# Patient Record
Sex: MALE
[De-identification: names, ages, dates, MRNs, and addresses within clinical notes are randomized per-mention and may not be internally consistent; named-entity substitution may affect disease eponyms.]

---

## 2020-08-28 ENCOUNTER — HOSPITAL ENCOUNTER (INPATIENT)
Dept: HOSPITAL 72 - EMR | Age: 70
LOS: 4 days | Discharge: HOME | DRG: 177 | End: 2020-09-01
Payer: MEDICARE

## 2020-08-28 VITALS — DIASTOLIC BLOOD PRESSURE: 89 MMHG | SYSTOLIC BLOOD PRESSURE: 157 MMHG

## 2020-08-28 VITALS — DIASTOLIC BLOOD PRESSURE: 67 MMHG | SYSTOLIC BLOOD PRESSURE: 128 MMHG

## 2020-08-28 VITALS — BODY MASS INDEX: 30.56 KG/M2 | WEIGHT: 179 LBS | HEIGHT: 64 IN

## 2020-08-28 VITALS — SYSTOLIC BLOOD PRESSURE: 117 MMHG | DIASTOLIC BLOOD PRESSURE: 69 MMHG

## 2020-08-28 VITALS — SYSTOLIC BLOOD PRESSURE: 170 MMHG | DIASTOLIC BLOOD PRESSURE: 90 MMHG

## 2020-08-28 DIAGNOSIS — R19.7: ICD-10-CM

## 2020-08-28 DIAGNOSIS — Z85.038: ICD-10-CM

## 2020-08-28 DIAGNOSIS — U07.1: Primary | ICD-10-CM

## 2020-08-28 DIAGNOSIS — R11.2: ICD-10-CM

## 2020-08-28 DIAGNOSIS — J12.89: ICD-10-CM

## 2020-08-28 DIAGNOSIS — D72.810: ICD-10-CM

## 2020-08-28 LAB
ADD MANUAL DIFF: NO
ALBUMIN SERPL-MCNC: 3.3 G/DL (ref 3.4–5)
ALBUMIN/GLOB SERPL: 0.9 {RATIO} (ref 1–2.7)
ALP SERPL-CCNC: 88 U/L (ref 46–116)
ALT SERPL-CCNC: 24 U/L (ref 12–78)
ANION GAP SERPL CALC-SCNC: 7 MMOL/L (ref 5–15)
APPEARANCE UR: CLEAR
APTT BLD: 31 SEC (ref 23–33)
APTT PPP: (no result) S
AST SERPL-CCNC: 21 U/L (ref 15–37)
BASOPHILS NFR BLD AUTO: 0.3 % (ref 0–2)
BILIRUB SERPL-MCNC: 0.3 MG/DL (ref 0.2–1)
BUN SERPL-MCNC: 13 MG/DL (ref 7–18)
CALCIUM SERPL-MCNC: 8.2 MG/DL (ref 8.5–10.1)
CHLORIDE SERPL-SCNC: 103 MMOL/L (ref 98–107)
CO2 SERPL-SCNC: 30 MMOL/L (ref 21–32)
CREAT SERPL-MCNC: 1 MG/DL (ref 0.55–1.3)
EOSINOPHIL NFR BLD AUTO: 2.6 % (ref 0–3)
ERYTHROCYTE [DISTWIDTH] IN BLOOD BY AUTOMATED COUNT: 11.3 % (ref 11.6–14.8)
FERRITIN SERPL-MCNC: 135 NG/ML (ref 8–388)
GLOBULIN SER-MCNC: 3.8 G/DL
GLUCOSE UR STRIP-MCNC: NEGATIVE MG/DL
HCT VFR BLD CALC: 41.6 % (ref 42–52)
HGB BLD-MCNC: 13.5 G/DL (ref 14.2–18)
INR PPP: 1 (ref 0.9–1.1)
KETONES UR QL STRIP: NEGATIVE
LEUKOCYTE ESTERASE UR QL STRIP: NEGATIVE
LYMPHOCYTES NFR BLD AUTO: 14.1 % (ref 20–45)
MCV RBC AUTO: 90 FL (ref 80–99)
MONOCYTES NFR BLD AUTO: 7 % (ref 1–10)
NEUTROPHILS NFR BLD AUTO: 76 % (ref 45–75)
NITRITE UR QL STRIP: NEGATIVE
PH UR STRIP: 7 [PH] (ref 4.5–8)
PLATELET # BLD: 221 K/UL (ref 150–450)
POTASSIUM SERPL-SCNC: 4.1 MMOL/L (ref 3.5–5.1)
PROT UR QL STRIP: NEGATIVE
RBC # BLD AUTO: 4.62 M/UL (ref 4.7–6.1)
SODIUM SERPL-SCNC: 140 MMOL/L (ref 136–145)
SP GR UR STRIP: 1.01 (ref 1–1.03)
UROBILINOGEN UR-MCNC: NORMAL MG/DL (ref 0–1)
WBC # BLD AUTO: 4.5 K/UL (ref 4.8–10.8)

## 2020-08-28 PROCEDURE — 96375 TX/PRO/DX INJ NEW DRUG ADDON: CPT

## 2020-08-28 PROCEDURE — 96361 HYDRATE IV INFUSION ADD-ON: CPT

## 2020-08-28 PROCEDURE — 83735 ASSAY OF MAGNESIUM: CPT

## 2020-08-28 PROCEDURE — 82728 ASSAY OF FERRITIN: CPT

## 2020-08-28 PROCEDURE — 81003 URINALYSIS AUTO W/O SCOPE: CPT

## 2020-08-28 PROCEDURE — 96374 THER/PROPH/DIAG INJ IV PUSH: CPT

## 2020-08-28 PROCEDURE — 71045 X-RAY EXAM CHEST 1 VIEW: CPT

## 2020-08-28 PROCEDURE — 36415 COLL VENOUS BLD VENIPUNCTURE: CPT

## 2020-08-28 PROCEDURE — 85730 THROMBOPLASTIN TIME PARTIAL: CPT

## 2020-08-28 PROCEDURE — 86140 C-REACTIVE PROTEIN: CPT

## 2020-08-28 PROCEDURE — 85610 PROTHROMBIN TIME: CPT

## 2020-08-28 PROCEDURE — 85651 RBC SED RATE NONAUTOMATED: CPT

## 2020-08-28 PROCEDURE — 85379 FIBRIN DEGRADATION QUANT: CPT

## 2020-08-28 PROCEDURE — 99285 EMERGENCY DEPT VISIT HI MDM: CPT

## 2020-08-28 PROCEDURE — 85025 COMPLETE CBC W/AUTO DIFF WBC: CPT

## 2020-08-28 PROCEDURE — 80053 COMPREHEN METABOLIC PANEL: CPT

## 2020-08-28 PROCEDURE — 83036 HEMOGLOBIN GLYCOSYLATED A1C: CPT

## 2020-08-28 PROCEDURE — 83690 ASSAY OF LIPASE: CPT

## 2020-08-28 RX ADMIN — ENOXAPARIN SODIUM SCH MG: 40 INJECTION SUBCUTANEOUS at 14:00

## 2020-08-28 RX ADMIN — DEXTROSE, SODIUM CHLORIDE, AND POTASSIUM CHLORIDE SCH MLS/HR: 5; .45; .15 INJECTION INTRAVENOUS at 14:00

## 2020-08-28 RX ADMIN — MORPHINE SULFATE PRN MG: 4 INJECTION INTRAVENOUS at 17:50

## 2020-08-28 RX ADMIN — MAGNESIUM HYDROXIDE PRN ML: 400 SUSPENSION ORAL at 21:29

## 2020-08-28 RX ADMIN — MORPHINE SULFATE PRN MG: 4 INJECTION INTRAVENOUS at 21:26

## 2020-08-28 RX ADMIN — AZITHROMYCIN DIHYDRATE SCH MG: 250 TABLET, FILM COATED ORAL at 14:00

## 2020-08-28 NOTE — EMERGENCY ROOM REPORT
History of Present Illness


General


Chief Complaint:  Abdominal Pain


Source:  Patient





Present Illness


HPI


70-year-old male presents for evaluation.  Brought in by EMS from home for 

vomiting and diarrhea.  States symptoms started yesterday.  Pain is cramping, 6 

out of 10, nonradiating.  Denies fevers or chills.  Denies chest pain or 

shortness of breath.  States that earlier this week he tested positive for 

COVID.  Had a mild cough so he got tested.  Lives with several of her family 

members.  Unclear of their COVID status.  No other aggravating relieving 

factors.  Denies any other associated symptoms


Allergies:  


Coded Allergies:  


     No Known Allergies (Unverified , 4/5/13)





COVID-19 Screening


Contact w/high risk pt:  No


Experienced COVID-19 symptoms?:  Yes


COVID-19 Testing performed PTA:  Yes


COVID-19 Screening:  Positive COVID-19


COVID-19 Testing Source:  NOSE





Patient History


Past Medical History:  other - colon ca


Past Surgical History:  none


Pertinent Family History:  none


Social History:  Denies: smoking, alcohol use, drug use


Immunizations:  UTD


Reviewed Nursing Documentation:  PMH: Agreed; PSxH: Agreed





Nursing Documentation-PMH


Past Medical History:  No Stated History


Hx Cancer:  Yes - COLON CA


Hx Gastrointestinal Problems:  Yes - COLOSTOMY





Review of Systems


All Other Systems:  negative except mentioned in HPI





Physical Exam





Vital Signs








  Date Time  Temp Pulse Resp B/P (MAP) Pulse Ox O2 Delivery O2 Flow Rate FiO2


 


8/28/20 09:18 98.8 88 16 170/90 (116) 96 Room Air  








Sp02 EP Interpretation:  reviewed, normal


General Appearance:  no apparent distress, alert, GCS 15, non-toxic


Head:  normocephalic, atraumatic


Eyes:  bilateral eye normal inspection, bilateral eye PERRL


ENT:  hearing grossly normal, normal pharynx, no angioedema, normal voice


Neck:  full range of motion, supple/symm/no masses


Respiratory:  chest non-tender, lungs clear, normal breath sounds, speaking 

full sentences


Cardiovascular #1:  regular rate, rhythm, no edema


Cardiovascular #2:  2+ carotid (R), 2+ carotid (L), 2+ radial (R), 2+ radial (L)

, 2+ dorsalis pedis (R), 2+ dorsalis pedis (L)


Gastrointestinal:  normal bowel sounds, non tender, soft, non-distended, no 

guarding, no rebound


Rectal:  deferred


Genitourinary:  normal inspection, no CVA tenderness


Musculoskeletal:  back normal, normal range of motion, gait/station normal, non-

tender


Neurologic:  alert, motor strength/tone normal, oriented x3, sensory intact, 

responsive, speech normal


Psychiatric:  judgement/insight normal, memory normal, mood/affect normal, no 

suicidal/homicidal ideation


Reflexes:  3+ bicep (R), 3+ bicep (L), 3+ tricep (R), 3+ tricep (L), 3+ knee (R)

, 3+ knee (L)


Lymphatic:  no adenopathy





Medical Decision Making


Diagnostic Impression:  


 Primary Impression:  


 Nausea vomiting and diarrhea


 Additional Impressions:  


 Weakness


 COVID-19


ER Course


Hospital Course 


70-year-old male presents ED with weakness, vomiting and diarrhea





Differentialcolitis, dehydration, COVID





Clinical course


After initial history and physical I ordered labs, IV fluids, meds, chest x-ray





Labs - leukopenia noted, Hb/Hct stable.  electrolytes ok.


COVID + 


CXR - no acute process





IV hydration continued.


Case discussed with Dr. Burch and he agreed to accept the patient to his 

service for further care and support 





I feel this is a highly complex case requiring extensive working including EKG/

Rhythm strip, Xray/CT/US, Blood/urine lab work, repeat exams while in ED, and 

administration of strong opiates/narcotics for pain control, admission to 

hospital or close patient follow up.  





Diagnosis -nausea vomiting and diarrhea, weakness, COVID-19





Patient admitted to hospital in serious condition





Laboratory Tests








Test


  8/28/20


09:32


 


White Blood Count


  4.5 K/UL


(4.8-10.8)  L


 


Red Blood Count


  4.62 M/UL


(4.70-6.10)  L


 


Hemoglobin


  13.5 G/DL


(14.2-18.0)  L


 


Hematocrit


  41.6 %


(42.0-52.0)  L


 


Mean Corpuscular Volume 90 FL (80-99)  


 


Mean Corpuscular Hemoglobin


  29.3 PG


(27.0-31.0)


 


Mean Corpuscular Hemoglobin


Concent 32.5 G/DL


(32.0-36.0)


 


Red Cell Distribution Width


  11.3 %


(11.6-14.8)  L


 


Platelet Count


  221 K/UL


(150-450)


 


Mean Platelet Volume


  5.6 FL


(6.5-10.1)  L


 


Neutrophils (%) (Auto)


  76.0 %


(45.0-75.0)  H


 


Lymphocytes (%) (Auto)


  14.1 %


(20.0-45.0)  L


 


Monocytes (%) (Auto)


  7.0 %


(1.0-10.0)


 


Eosinophils (%) (Auto)


  2.6 %


(0.0-3.0)


 


Basophils (%) (Auto)


  0.3 %


(0.0-2.0)


 


Erythrocyte Sedimentation Rate


  24 MM/HR


(0-20)  H


 


Prothrombin Time


  10.9 SEC


(9.30-11.50)


 


Prothromb Time International


Ratio 1.0 (0.9-1.1)  


 


 


Activated Partial


Thromboplast Time 31 SEC (23-33)


 


 


D-Dimer


  0.26 mg/L FEU


(0.00-0.49)


 


Urine Color Pale yellow  


 


Urine Appearance Clear  


 


Urine pH 7 (4.5-8.0)  


 


Urine Specific Gravity


  1.010


(1.005-1.035)


 


Urine Protein


  Negative


(NEGATIVE)


 


Urine Glucose (UA)


  Negative


(NEGATIVE)


 


Urine Ketones


  Negative


(NEGATIVE)


 


Urine Blood


  Negative


(NEGATIVE)


 


Urine Nitrite


  Negative


(NEGATIVE)


 


Urine Bilirubin


  Negative


(NEGATIVE)


 


Urine Urobilinogen


  Normal MG/DL


(0.0-1.0)


 


Urine Leukocyte Esterase


  Negative


(NEGATIVE)


 


Sodium Level


  140 MMOL/L


(136-145)


 


Potassium Level


  4.1 MMOL/L


(3.5-5.1)


 


Chloride Level


  103 MMOL/L


()


 


Carbon Dioxide Level


  30 MMOL/L


(21-32)


 


Anion Gap


  7 mmol/L


(5-15)


 


Blood Urea Nitrogen


  13 mg/dL


(7-18)


 


Creatinine


  1.0 MG/DL


(0.55-1.30)


 


Estimat Glomerular Filtration


Rate > 60 mL/min


(>60)


 


Glucose Level


  102 MG/DL


()


 


Calcium Level


  8.2 MG/DL


(8.5-10.1)  L


 


Ferritin


  135 NG/ML


(8-388)


 


Total Bilirubin


  0.3 MG/DL


(0.2-1.0)


 


Aspartate Amino Transf


(AST/SGOT) 21 U/L (15-37)


 


 


Alanine Aminotransferase


(ALT/SGPT) 24 U/L (12-78)


 


 


Alkaline Phosphatase


  88 U/L


()


 


C-Reactive Protein,


Quantitative < 0.4 mg/dL


(0.00-0.90)


 


Total Protein


  7.1 G/DL


(6.4-8.2)


 


Albumin


  3.3 G/DL


(3.4-5.0)  L


 


Globulin 3.8 g/dL  


 


Albumin/Globulin Ratio


  0.9 (1.0-2.7)


L


 


Lipase


  102 U/L


()








Chest X-Ray Diagnostic Results


Chest X-Ray Diagnostic Results :  


   Chest X-Ray Ordered:  Yes


   # of Views/Limited/Complete:  1 View


   Indication:  Other


   EP Interpretation:  Yes


   Interpretation:  no consolidation, no effusion, no pneumothorax


   Impression:  No acute disease


   Electronically Signed by:  Electronically signed by Jerrod Oquendo MD





Last Vital Signs








  Date Time  Temp Pulse Resp B/P (MAP) Pulse Ox O2 Delivery O2 Flow Rate FiO2


 


8/28/20 09:29  88 16   Room Air  


 


8/28/20 09:29 98.8   170/90 96   








Status:  improved


Disposition:  ADMITTED AS INPATIENT


Condition:  Serious


Referrals:  


[facility],REFERRING (PCP)











Jerrod Oquendo MD Aug 28, 2020 10:10

## 2020-08-28 NOTE — HISTORY & PHYSICAL
History and Physical


History & Physicial


 dictated [id number]











Andrew Burch MD Aug 28, 2020 13:14

## 2020-08-28 NOTE — HISTORY AND PHYSICAL REPORT
DATE OF ADMISSION:  08/28/2020

CHIEF COMPLAINT:  Diarrhea, abdominal pain, and chest pain.



HISTORY OF PRESENT ILLNESS:  This is a 70-year-old  male, who

started getting weak and dizzy on last Monday, which is four days prior to

admission.  The following day, he started having diarrhea.  On Monday, he

was seen at [facility] and was tested positive for COVID-19. His symptoms of

diarrhea continued and finally he came to the emergency room.  The patient

has also chest pain and some cough and not short of breath.



PAST MEDICAL HISTORY:  Reported history of colon cancer.



MEDICATIONS:  Reviewed in the EMR.



SOCIAL HISTORY:  No history of smoking or alcohol abuse.  The patient lives

at home with family.



ALLERGIES:  No known drug allergies.



REVIEW OF SYSTEMS:  As above.



PHYSICAL EXAMINATION:

GENERAL:  The patient is 70-year-old male, in no acute distress.

VITAL SIGNS:  Blood pressure 170/90, pulse 88, respirations 16, temperature

98.8.  The patient's O2 saturation is 96% on room air.

HEENT:  Pink conjunctivae.  Anicteric sclerae.

NECK:  Supple.

LUNGS:  Clear to auscultation.

HEART:  S1, S2 without murmurs or rubs.

ABDOMEN:  Soft, nontender.

EXTREMITIES:  No cyanosis or edema.



LABORATORY FINDINGS:  The CBC shows a WBC of 4500, hematocrit is 41.6,

hemoglobin is 13.5, and platelet is 221,000.  Chemistry panel shows a

serum sodium 140, potassium 4.1, chloride 103, carbon dioxide 30, BUN 13,

creatinine 1, calcium is 8.2.  Albumin is 3.3.  UA was negative.



ASSESSMENT:  This is 70-year-old  male, who comes to the emergency

room with diarrhea, chest pain, abdominal pain, some cough and reported

COVID-19 test positivity.  So far, he does not have shortness of breath.

Denies respiratory symptoms.



PLAN:  The patient will be admitted.  He is going to be on empiric

Zithromax.  ID consultation will be obtained.  He will be hydrated with IV

fluids.  The patient will be on full liquid diet.  DVT prophylaxis with

Lovenox.  Once he is improved, he can go home and needs to be in

isolation.









  ______________________________________________

  Andrew Burch M.D.





DR:  GILA

D:  08/28/2020 13:15

T:  08/28/2020 23:21

JOB#:  [id number]

CC:

## 2020-08-29 VITALS — DIASTOLIC BLOOD PRESSURE: 68 MMHG | SYSTOLIC BLOOD PRESSURE: 121 MMHG

## 2020-08-29 VITALS — DIASTOLIC BLOOD PRESSURE: 70 MMHG | SYSTOLIC BLOOD PRESSURE: 149 MMHG

## 2020-08-29 VITALS — DIASTOLIC BLOOD PRESSURE: 66 MMHG | SYSTOLIC BLOOD PRESSURE: 127 MMHG

## 2020-08-29 VITALS — DIASTOLIC BLOOD PRESSURE: 64 MMHG | SYSTOLIC BLOOD PRESSURE: 134 MMHG

## 2020-08-29 VITALS — DIASTOLIC BLOOD PRESSURE: 82 MMHG | SYSTOLIC BLOOD PRESSURE: 133 MMHG

## 2020-08-29 VITALS — DIASTOLIC BLOOD PRESSURE: 66 MMHG | SYSTOLIC BLOOD PRESSURE: 148 MMHG

## 2020-08-29 VITALS — SYSTOLIC BLOOD PRESSURE: 149 MMHG | DIASTOLIC BLOOD PRESSURE: 73 MMHG

## 2020-08-29 VITALS — DIASTOLIC BLOOD PRESSURE: 82 MMHG | SYSTOLIC BLOOD PRESSURE: 154 MMHG

## 2020-08-29 VITALS — DIASTOLIC BLOOD PRESSURE: 72 MMHG | SYSTOLIC BLOOD PRESSURE: 141 MMHG

## 2020-08-29 VITALS — DIASTOLIC BLOOD PRESSURE: 66 MMHG | SYSTOLIC BLOOD PRESSURE: 137 MMHG

## 2020-08-29 VITALS — DIASTOLIC BLOOD PRESSURE: 67 MMHG | SYSTOLIC BLOOD PRESSURE: 126 MMHG

## 2020-08-29 VITALS — DIASTOLIC BLOOD PRESSURE: 80 MMHG | SYSTOLIC BLOOD PRESSURE: 156 MMHG

## 2020-08-29 VITALS — DIASTOLIC BLOOD PRESSURE: 63 MMHG | SYSTOLIC BLOOD PRESSURE: 130 MMHG

## 2020-08-29 VITALS — DIASTOLIC BLOOD PRESSURE: 75 MMHG | SYSTOLIC BLOOD PRESSURE: 151 MMHG

## 2020-08-29 LAB
ADD MANUAL DIFF: NO
ALBUMIN SERPL-MCNC: 3.4 G/DL (ref 3.4–5)
ALBUMIN/GLOB SERPL: 0.8 {RATIO} (ref 1–2.7)
ALP SERPL-CCNC: 90 U/L (ref 46–116)
ALT SERPL-CCNC: 26 U/L (ref 12–78)
ANION GAP SERPL CALC-SCNC: 5 MMOL/L (ref 5–15)
AST SERPL-CCNC: 23 U/L (ref 15–37)
BASOPHILS NFR BLD AUTO: 0.4 % (ref 0–2)
BILIRUB SERPL-MCNC: 0.3 MG/DL (ref 0.2–1)
BUN SERPL-MCNC: 10 MG/DL (ref 7–18)
CALCIUM SERPL-MCNC: 8.1 MG/DL (ref 8.5–10.1)
CHLORIDE SERPL-SCNC: 101 MMOL/L (ref 98–107)
CO2 SERPL-SCNC: 32 MMOL/L (ref 21–32)
CREAT SERPL-MCNC: 1 MG/DL (ref 0.55–1.3)
EOSINOPHIL NFR BLD AUTO: 1.4 % (ref 0–3)
ERYTHROCYTE [DISTWIDTH] IN BLOOD BY AUTOMATED COUNT: 11.6 % (ref 11.6–14.8)
GLOBULIN SER-MCNC: 4.1 G/DL
HCT VFR BLD CALC: 41.2 % (ref 42–52)
HGB BLD-MCNC: 13.3 G/DL (ref 14.2–18)
LYMPHOCYTES NFR BLD AUTO: 9.9 % (ref 20–45)
MCV RBC AUTO: 91 FL (ref 80–99)
MONOCYTES NFR BLD AUTO: 4.5 % (ref 1–10)
NEUTROPHILS NFR BLD AUTO: 83.8 % (ref 45–75)
PLATELET # BLD: 216 K/UL (ref 150–450)
POTASSIUM SERPL-SCNC: 4.3 MMOL/L (ref 3.5–5.1)
RBC # BLD AUTO: 4.54 M/UL (ref 4.7–6.1)
SODIUM SERPL-SCNC: 138 MMOL/L (ref 136–145)
WBC # BLD AUTO: 5 K/UL (ref 4.8–10.8)

## 2020-08-29 RX ADMIN — DEXTROSE, SODIUM CHLORIDE, AND POTASSIUM CHLORIDE SCH MLS/HR: 5; .45; .15 INJECTION INTRAVENOUS at 00:05

## 2020-08-29 RX ADMIN — MORPHINE SULFATE PRN MG: 4 INJECTION INTRAVENOUS at 05:28

## 2020-08-29 RX ADMIN — DEXTROSE, SODIUM CHLORIDE, AND POTASSIUM CHLORIDE SCH MLS/HR: 5; .45; .15 INJECTION INTRAVENOUS at 09:03

## 2020-08-29 RX ADMIN — AZITHROMYCIN DIHYDRATE SCH MG: 250 TABLET, FILM COATED ORAL at 08:56

## 2020-08-29 RX ADMIN — MORPHINE SULFATE PRN MG: 4 INJECTION INTRAVENOUS at 10:25

## 2020-08-29 RX ADMIN — ENOXAPARIN SODIUM SCH MG: 40 INJECTION SUBCUTANEOUS at 08:56

## 2020-08-29 RX ADMIN — MORPHINE SULFATE PRN MG: 4 INJECTION INTRAVENOUS at 20:56

## 2020-08-29 RX ADMIN — MAGNESIUM HYDROXIDE PRN ML: 400 SUSPENSION ORAL at 21:02

## 2020-08-29 RX ADMIN — DEXTROSE, SODIUM CHLORIDE, AND POTASSIUM CHLORIDE SCH MLS/HR: 5; .45; .15 INJECTION INTRAVENOUS at 21:30

## 2020-08-29 NOTE — GENERAL PROGRESS NOTE
Assessment/Plan


Problem List:  


(1) COVID-19


ICD Codes:  U07.1 - COVID-19


SNOMED:  239084038


(2) Nausea vomiting and diarrhea


ICD Codes:  R11.2 - Nausea with vomiting, unspecified; R19.7 - Diarrhea, 

unspecified


SNOMED:  4984206


(3) Weakness


ICD Codes:  R53.1 - Weakness; R19.7 - Diarrhea, unspecified


SNOMED:  16194279


Assessment/Plan:


abxs


IVF


follow labs


Discussed with RN





Subjective


Allergies:  


Coded Allergies:  


     No Known Allergies (Unverified , 4/5/13)


Subjective


diarrhea is better





Objective





Last 24 Hour Vital Signs








  Date Time  Temp Pulse Resp B/P (MAP) Pulse Ox O2 Delivery O2 Flow Rate FiO2


 


8/29/20 12:30 97.7 70 18 154/82 (106) 97   


 


8/29/20 08:30 97.6 81 18 148/66 (93) 98   


 


8/29/20 08:00 97.5 81 18 151/75 (100) 99   


 


8/29/20 07:48      Room Air  


 


8/29/20 07:30 97.6 72 18 134/64 (87) 97   


 


8/29/20 06:30 98.0 68 18 126/67 (86) 98   


 


8/29/20 05:30 98.8 74 18 149/70 (96) 96   


 


8/29/20 04:30 97.5 71 18 149/73 (98) 99   


 


8/29/20 04:00 97.5 63 18 127/66 (86) 97   


 


8/29/20 03:30 97.4 59 18 130/63 (85) 100   


 


8/29/20 03:24  56 20  96   


 


8/29/20 03:19 97.3    96 Nasal Cannula 2.0 


 


8/29/20 03:00 97.6 63 16 141/72 (95) 100   


 


8/29/20 00:00 98.3 79 18 121/68 (85) 97   


 


8/28/20 21:00      Room Air  


 


8/28/20 20:00 98.0 74 18 117/69 (85) 97   


 


8/28/20 16:00 98.9 66 18 128/67 (87) 97   

















Intake and Output  


 


 8/28/20 8/29/20





 19:00 07:00


 


Intake Total 1100 ml 1000 ml


 


Output Total  1400 ml


 


Balance 1100 ml -400 ml


 


  


 


Intake Oral 0 ml 


 


IV Total 500 ml 1000 ml


 


Other 600 ml 


 


Output Urine Total  1400 ml


 


# Voids  5








Laboratory Tests


8/29/20 05:30: 


White Blood Count 5.0, Red Blood Count 4.54L, Hemoglobin 13.3L, Hematocrit 41.2L

, Mean Corpuscular Volume 91, Mean Corpuscular Hemoglobin 29.2, Mean 

Corpuscular Hemoglobin Concent 32.2, Red Cell Distribution Width 11.6, Platelet 

Count 216, Mean Platelet Volume 5.3L, Neutrophils (%) (Auto) 83.8H, Lymphocytes 

(%) (Auto) 9.9L, Monocytes (%) (Auto) 4.5, Eosinophils (%) (Auto) 1.4, 

Basophils (%) (Auto) 0.4, Sodium Level 138, Potassium Level 4.3, Chloride Level 

101, Carbon Dioxide Level 32, Anion Gap 5, Blood Urea Nitrogen 10, Creatinine 

1.0, Estimat Glomerular Filtration Rate > 60, Glucose Level 129H, Hemoglobin 

A1c 5.9, Calcium Level 8.1L, Magnesium Level 2.2, Total Bilirubin 0.3, 

Aspartate Amino Transf (AST/SGOT) 23, Alanine Aminotransferase (ALT/SGPT) 26, 

Alkaline Phosphatase 90, Total Protein 7.5, Albumin 3.4, Globulin 4.1, Albumin/

Globulin Ratio 0.8L


Height (Feet):  5


Height (Inches):  4.00


Weight (Pounds):  180











Andrew Burch MD Aug 29, 2020 14:36

## 2020-08-30 VITALS — DIASTOLIC BLOOD PRESSURE: 75 MMHG | SYSTOLIC BLOOD PRESSURE: 127 MMHG

## 2020-08-30 VITALS — DIASTOLIC BLOOD PRESSURE: 80 MMHG | SYSTOLIC BLOOD PRESSURE: 130 MMHG

## 2020-08-30 VITALS — SYSTOLIC BLOOD PRESSURE: 138 MMHG | DIASTOLIC BLOOD PRESSURE: 79 MMHG

## 2020-08-30 VITALS — DIASTOLIC BLOOD PRESSURE: 72 MMHG | SYSTOLIC BLOOD PRESSURE: 132 MMHG

## 2020-08-30 VITALS — SYSTOLIC BLOOD PRESSURE: 135 MMHG | DIASTOLIC BLOOD PRESSURE: 68 MMHG

## 2020-08-30 VITALS — SYSTOLIC BLOOD PRESSURE: 126 MMHG | DIASTOLIC BLOOD PRESSURE: 77 MMHG

## 2020-08-30 VITALS — SYSTOLIC BLOOD PRESSURE: 142 MMHG | DIASTOLIC BLOOD PRESSURE: 73 MMHG

## 2020-08-30 VITALS — SYSTOLIC BLOOD PRESSURE: 134 MMHG | DIASTOLIC BLOOD PRESSURE: 82 MMHG

## 2020-08-30 VITALS — SYSTOLIC BLOOD PRESSURE: 128 MMHG | DIASTOLIC BLOOD PRESSURE: 72 MMHG

## 2020-08-30 VITALS — DIASTOLIC BLOOD PRESSURE: 82 MMHG | SYSTOLIC BLOOD PRESSURE: 134 MMHG

## 2020-08-30 VITALS — SYSTOLIC BLOOD PRESSURE: 134 MMHG | DIASTOLIC BLOOD PRESSURE: 68 MMHG

## 2020-08-30 VITALS — DIASTOLIC BLOOD PRESSURE: 68 MMHG | SYSTOLIC BLOOD PRESSURE: 135 MMHG

## 2020-08-30 VITALS — DIASTOLIC BLOOD PRESSURE: 79 MMHG | SYSTOLIC BLOOD PRESSURE: 138 MMHG

## 2020-08-30 RX ADMIN — AZITHROMYCIN DIHYDRATE SCH MG: 250 TABLET, FILM COATED ORAL at 09:50

## 2020-08-30 RX ADMIN — DEXTROSE, SODIUM CHLORIDE, AND POTASSIUM CHLORIDE SCH MLS/HR: 5; .45; .15 INJECTION INTRAVENOUS at 06:00

## 2020-08-30 RX ADMIN — ENOXAPARIN SODIUM SCH MG: 40 INJECTION SUBCUTANEOUS at 09:51

## 2020-08-30 RX ADMIN — DEXTROSE, SODIUM CHLORIDE, AND POTASSIUM CHLORIDE SCH MLS/HR: 5; .45; .15 INJECTION INTRAVENOUS at 17:31

## 2020-08-30 NOTE — CONSULTATION
DATE OF CONSULTATION:  08/30/2020

INFECTIOUS DISEASE CONSULTATION



CONSULTING PHYSICIAN:  Anatoly Burch MD.



PRIMARY ATTENDING:  Andrew Burch MD.



REASON FOR CONSULT:  COVID-19 disease.



HISTORY OF PRESENT ILLNESS:  This is a 70-year-old  male admitted

on 08/28/2020 from home because of abdominal pain, diarrhea.  Patient had

a cough that is slightly productive, was diagnosed with COVID-19 disease.

He is not hypoxemic and in room air oxygenation.



PAST MEDICAL HISTORY:  Significant for history of colon cancer.  Had

surgery in 2009.



ALLERGIES:  No known drug allergies.



MEDICATIONS:  Robitussin, enoxaparin, famotidine, Zithromax,

Tylenol, morphine, Zofran, Ambien.



SOCIAL HISTORY:  .  Originally from [facility].  Denies alcohol,

drug abuse, or smoking.  He is retired.



REVIEW OF SYSTEMS:  No fever.  No chills.  Mild productive coughing.  No

nausea.  No vomiting.  Mild diarrhea.  No dysuria.



PHYSICAL EXAMINATION:

VITAL SIGNS:  Temperature 98.3, pulse 71, blood pressure 138/79.

GENERAL APPEARANCE:  Overweight.

HEAD AND NECK:  Pink conjunctivae.

HEART:  Normal rate.

LUNGS:  Clear.

ABDOMEN:  Soft, nontender.

EXTREMITIES:  No edema.



LABORATORY AND DIAGNOSTIC DATA:  WBC 5000, hemoglobin 13.3, hematocrit

41.2, platelets 216, lymphocyte count is low at 9.9%.  Sodium 138,

potassium 4.3, chloride 101,  BUN 10, creatinine 1,

glucose 129.  LFTs within normal limits.  COVID-19 test was positive.

Chest x-ray, no acute infiltrate.



IMPRESSION:  COVID-19 disease.  Seems patient had no hypoxemia.  Has

history of colon cancer.  Has lymphopenia.



RECOMMENDATION:  Discontinue Zithromax.  We will follow up the clinical

course.



At the end of my exam, I thank Dr. Andrew Burch, for involving me in

the care of this patient.









  ______________________________________________

  Anatoly Burch M.D.





DR:  GARY

D:  08/30/2020 13:34

T:  08/30/2020 20:09

JOB#:  [id number]

CC:



RASHAWN

## 2020-08-30 NOTE — GENERAL PROGRESS NOTE
Assessment/Plan


Problem List:  


(1) COVID-19


ICD Codes:  U07.1 - COVID-19


SNOMED:  177153835


(2) Nausea vomiting and diarrhea


ICD Codes:  R11.2 - Nausea with vomiting, unspecified; R19.7 - Diarrhea, 

unspecified


SNOMED:  6416615


(3) Weakness


ICD Codes:  R53.1 - Weakness; R19.7 - Diarrhea, unspecified


SNOMED:  20575465


Assessment/Plan:


add Robitussin with codeine


abxs


IVF


Discussed with RN





Subjective


Allergies:  


Coded Allergies:  


     No Known Allergies (Unverified , 4/5/13)


Subjective


C/O cough





Objective





Last 24 Hour Vital Signs








  Date Time  Temp Pulse Resp B/P (MAP) Pulse Ox O2 Delivery O2 Flow Rate FiO2


 


8/30/20 08:30 98.3 72 20 134/82 (99) 97   


 


8/30/20 08:00 98.3 72 20 134/82 (99) 97   


 


8/30/20 04:00 98.0 70 19 138/79 (98) 96   


 


8/30/20 00:00 98.2 60 20 142/73 (96) 95   


 


8/29/20 20:36      Room Air  


 


8/29/20 20:00 97.7 56 20 137/66 (89) 95   


 


8/29/20 16:30 96.3 71 18 156/80 (105) 97   


 


8/29/20 12:30 97.7 70 18 154/82 (106) 97   

















Intake and Output  


 


 8/29/20 8/30/20





 19:00 07:00


 


Intake Total 600 ml 700 ml


 


Balance 600 ml 700 ml


 


  


 


Intake Oral 600 ml 


 


IV Total  700 ml


 


# Voids 2 3


 


# Bowel Movements  2








Height (Feet):  5


Height (Inches):  4.00


Weight (Pounds):  180


Cardiovascular:  normal rate


Respiratory/Chest:  lungs clear


Edema:  no edema noted Generalized











Andrew Burch MD Aug 30, 2020 11:44

## 2020-08-30 NOTE — CONSULTATION
DATE OF CONSULTATION:  08/30/2020

This is a 70-year-old male who was admitted to the hospital with

COVID-19 pneumonia.  He reports diarrhea, weakness and dizziness.  He has

tested outside and is positive for COVID-19.  He also underwent rapid gene

assay at this hospital, which came back positive.  The patient states that

he is feeling better overnight.  I have reviewed his x-ray, which shows

clear lung fields bilaterally.  Currently, he is also noted to be

saturating well on room air.



PAST HISTORY:  Unremarkable except for history of colon cancer.



SOCIAL HISTORY:  Denies alcohol or tobacco use.  Lives at home with

family.



ALLERGIES:  None.



REVIEW OF SYSTEMS:  Denies any headaches, hematemesis, melena, or

hematochezia.



PHYSICAL EXAMINATION:

GENERAL:  Reveals a 70-year-old male.

HEENT:  Unremarkable.

LUNGS:  Clear breath sounds.

ABDOMEN:  Soft.

EXTREMITIES:  There is no edema.

NEUROLOGIC:  Nonfocal.



LABORATORY DATA:  Lab testing is unremarkable except for borderline low

white count of 4500.



IMPRESSION:

1. COVID-19 pneumonia.

2. Normoxemia.



DISCUSSION:  Continue medications.  At this time, I feel he can be

discharged safely as long as he can self quarantine.  We will follow as

needed.









  ______________________________________________

  Shayne Saleh M.D. DR:  SAMIRA

D:  08/30/2020 10:58

T:  08/30/2020 17:59

JOB#:  [id number]

CC:

## 2020-08-31 VITALS — SYSTOLIC BLOOD PRESSURE: 134 MMHG | DIASTOLIC BLOOD PRESSURE: 81 MMHG

## 2020-08-31 VITALS — DIASTOLIC BLOOD PRESSURE: 65 MMHG | SYSTOLIC BLOOD PRESSURE: 137 MMHG

## 2020-08-31 VITALS — DIASTOLIC BLOOD PRESSURE: 71 MMHG | SYSTOLIC BLOOD PRESSURE: 144 MMHG

## 2020-08-31 VITALS — DIASTOLIC BLOOD PRESSURE: 79 MMHG | SYSTOLIC BLOOD PRESSURE: 128 MMHG

## 2020-08-31 VITALS — SYSTOLIC BLOOD PRESSURE: 110 MMHG | DIASTOLIC BLOOD PRESSURE: 56 MMHG

## 2020-08-31 VITALS — DIASTOLIC BLOOD PRESSURE: 68 MMHG | SYSTOLIC BLOOD PRESSURE: 132 MMHG

## 2020-08-31 VITALS — SYSTOLIC BLOOD PRESSURE: 135 MMHG | DIASTOLIC BLOOD PRESSURE: 75 MMHG

## 2020-08-31 VITALS — DIASTOLIC BLOOD PRESSURE: 83 MMHG | SYSTOLIC BLOOD PRESSURE: 119 MMHG

## 2020-08-31 VITALS — DIASTOLIC BLOOD PRESSURE: 77 MMHG | SYSTOLIC BLOOD PRESSURE: 134 MMHG

## 2020-08-31 RX ADMIN — DEXTROSE, SODIUM CHLORIDE, AND POTASSIUM CHLORIDE SCH MLS/HR: 5; .45; .15 INJECTION INTRAVENOUS at 22:25

## 2020-08-31 RX ADMIN — DEXTROSE, SODIUM CHLORIDE, AND POTASSIUM CHLORIDE SCH MLS/HR: 5; .45; .15 INJECTION INTRAVENOUS at 02:00

## 2020-08-31 RX ADMIN — DEXTROSE, SODIUM CHLORIDE, AND POTASSIUM CHLORIDE SCH MLS/HR: 5; .45; .15 INJECTION INTRAVENOUS at 12:29

## 2020-08-31 RX ADMIN — ENOXAPARIN SODIUM SCH MG: 40 INJECTION SUBCUTANEOUS at 08:11

## 2020-08-31 NOTE — INFECTIOUS DISEASES PROG NOTE
Assessment/Plan


Assessment/Plan


IMPRESSION:  


COVID-19 disease.  Seems patient had no hypoxemia. 


History of colon cancer.  


Has lymphopenia.


Diarrhea resolved





RECOMMENDATION:  


Observe off antibiotic





Subjective


ROS Limited/Unobtainable:  No


Constitutional:  Reports: no symptoms


Respiratory:  Reports: no symptoms


Cardiovascular:  Reports: no symptoms


Gastrointestinal/Abdominal:  Reports: no symptoms


Allergies:  


Coded Allergies:  


     No Known Allergies (Unverified , 4/5/13)





Objective





Last 24 Hour Vital Signs








  Date Time  Temp Pulse Resp B/P (MAP) Pulse Ox O2 Delivery O2 Flow Rate FiO2


 


8/31/20 08:00 97.9 98 18 119/83 (95) 93   


 


8/31/20 04:00 97.7 71 19 144/71 (95) 94   


 


8/31/20 03:02 97.8 76 18 137/65 (89) 95   


 


8/31/20 02:02 98.2 78 19 132/68 (89) 95   


 


8/31/20 01:02 98.3 82 18 135/75 (95) 94   


 


8/31/20 00:02 98.0 76 20 133/79 (97) 93   


 


8/30/20 23:02 97.6 85 19 127/75 (92) 95   


 


8/30/20 22:56      Room Air  


 


8/30/20 22:32 97.8 87 19 128/72 (90) 96   


 


8/30/20 22:19 98.1    95 Room Air  


 


8/30/20 22:02 98.1 89 18 134/68 (90) 95   


 


8/30/20 21:56  95 18  95   


 


8/30/20 21:32 98.2 93 19 132/72 (92) 95   


 


8/30/20 21:02 98.1 95 18 135/68 (90) 95   


 


8/30/20 20:00 97.7 77 19 126/77 (93) 95   


 


8/30/20 16:00 98.6 74 19 130/80 (97) 97   


 


8/30/20 12:00 98.3 71 19 138/79 (98) 98   








Height (Feet):  5


Height (Inches):  4.00


Weight (Pounds):  180


HEENT:  mucous membranes moist


Respiratory/Chest:  lungs clear


Cardiovascular:  normal rate


Abdomen:  soft, non tender


Extremities:  no edema


Neurologic/Psychiatric:  alert, oriented x 3, responsive





Current Medications








 Medications


  (Trade)  Dose


 Ordered  Sig/Leslie


 Route


 PRN Reason  Start Time


 Stop Time Status Last Admin


Dose Admin


 


 Acetaminophen


  (Tylenol)  650 mg  Q4H  PRN


 ORAL


 Mild Pain (Pain Scale 1-3)  8/28/20 13:15


 9/27/20 13:14   


 


 


 Dextrose


  (Dextrose 50%)  25 ml  Q30M  PRN


 IV


 Hypoglycemia  8/28/20 13:15


 11/26/20 13:14   


 


 


 Dextrose


  (Dextrose 50%)  50 ml  Q30M  PRN


 IV


 Hypoglycemia  8/28/20 13:15


 11/26/20 13:14   


 


 


 Dextrose/


 Electrolytes  1,000 ml @ 


 100 mls/hr  Q10H


 IV


   8/28/20 14:00


 9/27/20 13:59  8/30/20 17:31


 


 


 Enoxaparin Sodium


  (Lovenox)  40 mg  DAILY


 SUBQ


   8/28/20 14:00


 11/26/20 13:59  8/31/20 08:11


 


 


 Famotidine


  (Pepcid)  20 mg  BID


 ORAL


   8/28/20 14:00


 11/26/20 13:59  8/31/20 08:10


 


 


 Guaifenesin/


 Codeine Phosphate


  (Robitussin with


 codeine)  5 ml  Q6H  PRN


 ORAL


 For Cough  8/30/20 11:44


 9/29/20 11:43   


 


 


 Magnesium


 Hydroxide


  (Mom)  30 ml  HSPRN  PRN


 ORAL


 Constipation  8/28/20 13:15


 9/27/20 13:14  8/29/20 21:02


 


 


 Morphine Sulfate


  (Morphine


 Sulfate)  2 mg  Q3H  PRN


 IVP


 Moderate Pain (Pain Scale 4-6)  8/28/20 13:15


 9/4/20 13:14  8/30/20 03:08


 


 


 Morphine Sulfate


  (Morphine


 Sulfate)  4 mg  Q3H  PRN


 IVP


 Severe Pain (Pain Scale 7-10)  8/28/20 13:15


 9/4/20 13:14  8/29/20 20:56


 


 


 Ondansetron HCl


  (Zofran)  4 mg  Q6H  PRN


 IVP


 Nausea & Vomiting  8/28/20 13:15


 9/27/20 13:14   


 


 


 Zolpidem Tartrate


  (Ambien)  5 mg  HSPRN  PRN


 ORAL


 Insomnia  8/28/20 13:15


 9/4/20 13:14   


 

















Anatoly Burch MD Aug 31, 2020 10:46

## 2020-08-31 NOTE — GENERAL PROGRESS NOTE
Assessment/Plan


Problem List:  


(1) COVID-19


ICD Codes:  U07.1 - COVID-19


SNOMED:  065045157


(2) Nausea vomiting and diarrhea


ICD Codes:  R11.2 - Nausea with vomiting, unspecified; R19.7 - Diarrhea, 

unspecified


SNOMED:  6575980


(3) Weakness


ICD Codes:  R53.1 - Weakness; R19.7 - Diarrhea, unspecified


SNOMED:  25837724


Assessment/Plan:


continueRobitussin with codeine


abxs


IVF


Discussed with RN





Subjective


Allergies:  


Coded Allergies:  


     No Known Allergies (Unverified , 4/5/13)


Subjective


weak





Objective





Last 24 Hour Vital Signs








  Date Time  Temp Pulse Resp B/P (MAP) Pulse Ox O2 Delivery O2 Flow Rate FiO2


 


8/31/20 19:02 98.2 75 18 110/56 (74) 95   


 


8/31/20 15:02 97.2 81 18 134/81 (98) 97   


 


8/31/20 11:02 97.3 73 20 134/77 (96) 97   


 


8/31/20 09:00      Room Air  


 


8/31/20 08:00 97.9 98 18 119/83 (95) 93   


 


8/31/20 04:00 97.7 71 19 144/71 (95) 94   


 


8/31/20 03:02 97.8 76 18 137/65 (89) 95   


 


8/31/20 02:02 98.2 78 19 132/68 (89) 95   


 


8/31/20 01:02 98.3 82 18 135/75 (95) 94   


 


8/31/20 00:02 98.0 76 20 133/79 (97) 93   

















Intake and Output  


 


 8/30/20 8/31/20





 19:00 07:00


 


Intake Total 2360 ml 150 ml


 


Balance 2360 ml 150 ml


 


  


 


Intake Oral 1360 ml 50 ml


 


IV Total 1000 ml 100 ml


 


# Voids 6 2








Height (Feet):  5


Height (Inches):  4.00


Weight (Pounds):  180











Andrew Burch MD Aug 31, 2020 23:06

## 2020-08-31 NOTE — PULMONOLOGY PROGRESS NOTE
Subjective


Interval Events:  None new; discussed with RN


Constitutional:  Reports: no symptoms


HEENT:  Repors: no symptoms


Respiratory:  Reports: no symptoms


Gastrointestinal/Abdominal:  Reports: no symptoms


Allergies:  


Coded Allergies:  


     No Known Allergies (Unverified , 4/5/13)





Objective





Last 24 Hour Vital Signs








  Date Time  Temp Pulse Resp B/P (MAP) Pulse Ox O2 Delivery O2 Flow Rate FiO2


 


8/31/20 04:00 97.7 71 19 144/71 (95) 94   


 


8/31/20 03:02 97.8 76 18 137/65 (89) 95   


 


8/31/20 02:02 98.2 78 19 132/68 (89) 95   


 


8/31/20 01:02 98.3 82 18 135/75 (95) 94   


 


8/31/20 00:02 98.0 76 20 133/79 (97) 93   


 


8/30/20 23:02 97.6 85 19 127/75 (92) 95   


 


8/30/20 22:56      Room Air  


 


8/30/20 22:32 97.8 87 19 128/72 (90) 96   


 


8/30/20 22:19 98.1    95 Room Air  


 


8/30/20 22:02 98.1 89 18 134/68 (90) 95   


 


8/30/20 21:56  95 18  95   


 


8/30/20 21:32 98.2 93 19 132/72 (92) 95   


 


8/30/20 21:02 98.1 95 18 135/68 (90) 95   


 


8/30/20 20:00 97.7 77 19 126/77 (93) 95   


 


8/30/20 16:00 98.6 74 19 130/80 (97) 97   


 


8/30/20 12:00 98.3 71 19 138/79 (98) 98   

















Intake and Output  


 


 8/30/20 8/31/20





 19:00 07:00


 


Intake Total 2260 ml 50 ml


 


Balance 2260 ml 50 ml


 


  


 


Intake Oral 1360 ml 50 ml


 


IV Total 900 ml 


 


# Voids 6 2








General Appearance:  no acute distress


HEENT:  normocephalic


Respiratory:  chest wall non-tender, lungs clear


Cardiovascular:  normal peripheral pulses


Abdomen:  normal bowel sounds





Current Medications








 Medications


  (Trade)  Dose


 Ordered  Sig/Leslie


 Route


 PRN Reason  Start Time


 Stop Time Status Last Admin


Dose Admin


 


 Acetaminophen


  (Tylenol)  650 mg  Q4H  PRN


 ORAL


 Mild Pain (Pain Scale 1-3)  8/28/20 13:15


 9/27/20 13:14   


 


 


 Dextrose


  (Dextrose 50%)  25 ml  Q30M  PRN


 IV


 Hypoglycemia  8/28/20 13:15


 11/26/20 13:14   


 


 


 Dextrose


  (Dextrose 50%)  50 ml  Q30M  PRN


 IV


 Hypoglycemia  8/28/20 13:15


 11/26/20 13:14   


 


 


 Dextrose/


 Electrolytes  1,000 ml @ 


 100 mls/hr  Q10H


 IV


   8/28/20 14:00


 9/27/20 13:59  8/30/20 17:31


 


 


 Enoxaparin Sodium


  (Lovenox)  40 mg  DAILY


 SUBQ


   8/28/20 14:00


 11/26/20 13:59  8/31/20 08:11


 


 


 Famotidine


  (Pepcid)  20 mg  BID


 ORAL


   8/28/20 14:00


 11/26/20 13:59  8/31/20 08:10


 


 


 Guaifenesin/


 Codeine Phosphate


  (Robitussin with


 codeine)  5 ml  Q6H  PRN


 ORAL


 For Cough  8/30/20 11:44


 9/29/20 11:43   


 


 


 Magnesium


 Hydroxide


  (Mom)  30 ml  HSPRN  PRN


 ORAL


 Constipation  8/28/20 13:15


 9/27/20 13:14  8/29/20 21:02


 


 


 Morphine Sulfate


  (Morphine


 Sulfate)  2 mg  Q3H  PRN


 IVP


 Moderate Pain (Pain Scale 4-6)  8/28/20 13:15


 9/4/20 13:14  8/30/20 03:08


 


 


 Morphine Sulfate


  (Morphine


 Sulfate)  4 mg  Q3H  PRN


 IVP


 Severe Pain (Pain Scale 7-10)  8/28/20 13:15


 9/4/20 13:14  8/29/20 20:56


 


 


 Ondansetron HCl


  (Zofran)  4 mg  Q6H  PRN


 IVP


 Nausea & Vomiting  8/28/20 13:15


 9/27/20 13:14   


 


 


 Zolpidem Tartrate


  (Ambien)  5 mg  HSPRN  PRN


 ORAL


 Insomnia  8/28/20 13:15


 9/4/20 13:14   


 











Assessment/Plan


Assessment/Plan


IMPRESSION:


1. COVID-19 pneumonia.


2. Normoxemia.





DISCUSSION:  Continue medications.  At this time, I feel he can be


discharged safely as long as he can self quarantine.  I will follow as


needed.














  ______________________________________________


  ALEX Kearns Omar Syed MD Aug 31, 2020 09:43

## 2020-09-01 VITALS — SYSTOLIC BLOOD PRESSURE: 133 MMHG | DIASTOLIC BLOOD PRESSURE: 77 MMHG

## 2020-09-01 VITALS — DIASTOLIC BLOOD PRESSURE: 73 MMHG | SYSTOLIC BLOOD PRESSURE: 135 MMHG

## 2020-09-01 VITALS — DIASTOLIC BLOOD PRESSURE: 81 MMHG | SYSTOLIC BLOOD PRESSURE: 137 MMHG

## 2020-09-01 VITALS — DIASTOLIC BLOOD PRESSURE: 84 MMHG | SYSTOLIC BLOOD PRESSURE: 142 MMHG

## 2020-09-01 VITALS — DIASTOLIC BLOOD PRESSURE: 82 MMHG | SYSTOLIC BLOOD PRESSURE: 143 MMHG

## 2020-09-01 RX ADMIN — DEXTROSE, SODIUM CHLORIDE, AND POTASSIUM CHLORIDE SCH MLS/HR: 5; .45; .15 INJECTION INTRAVENOUS at 17:48

## 2020-09-01 RX ADMIN — ENOXAPARIN SODIUM SCH MG: 40 INJECTION SUBCUTANEOUS at 08:39

## 2020-09-01 RX ADMIN — DEXTROSE, SODIUM CHLORIDE, AND POTASSIUM CHLORIDE SCH MLS/HR: 5; .45; .15 INJECTION INTRAVENOUS at 10:05

## 2020-09-01 NOTE — GENERAL PROGRESS NOTE
Assessment/Plan


Problem List:  


(1) COVID-19


ICD Codes:  U07.1 - COVID-19


SNOMED:  158544653


(2) Nausea vomiting and diarrhea


ICD Codes:  R11.2 - Nausea with vomiting, unspecified; R19.7 - Diarrhea, 

unspecified


SNOMED:  3350611


(3) Weakness


ICD Codes:  R53.1 - Weakness; R19.7 - Diarrhea, unspecified


SNOMED:  88578007


Assessment/Plan:


DC today





Subjective


Allergies:  


Coded Allergies:  


     No Known Allergies (Unverified , 4/5/13)


Subjective


feels ok


wants to go home





Objective





Last 24 Hour Vital Signs








  Date Time  Temp Pulse Resp B/P (MAP) Pulse Ox O2 Delivery O2 Flow Rate FiO2


 


9/1/20 12:15 98.2       


 


9/1/20 12:00 100.0 73 18 137/81 (99) 92   


 


9/1/20 09:00      Room Air  


 


9/1/20 08:00 98.8 73 18 133/77 (95) 93   


 


9/1/20 04:00 96.8 76 18 135/73 (93) 93   


 


9/1/20 00:00 97.3 79 18 143/82 (102) 96   


 


8/31/20 21:00      Room Air  


 


8/31/20 19:02 98.2 75 18 110/56 (74) 95   


 


8/31/20 15:02 97.2 81 18 134/81 (98) 97   

















Intake and Output  


 


 8/31/20 9/1/20





 19:00 07:00


 


Intake Total 1701 ml 1560 ml


 


Balance 1701 ml 1560 ml


 


  


 


IV Total 701 ml 1200 ml


 


Other 1000 ml 360 ml


 


# Voids  3


 


# Bowel Movements 1 








Height (Feet):  5


Height (Inches):  4.00


Weight (Pounds):  179


Cardiovascular:  normal rate


Respiratory/Chest:  lungs clear











Andrew Burch MD Sep 1, 2020 14:08

## 2020-09-01 NOTE — PULMONOLOGY PROGRESS NOTE
Subjective


ROS Limited/Unobtainable:  No


Interval Events:  None new; discussed with RN


Constitutional:  Reports: no symptoms


HEENT:  Repors: no symptoms


Respiratory:  Reports: no symptoms


Gastrointestinal/Abdominal:  Reports: no symptoms


Allergies:  


Coded Allergies:  


     No Known Allergies (Unverified , 4/5/13)





Objective





Last 24 Hour Vital Signs








  Date Time  Temp Pulse Resp B/P (MAP) Pulse Ox O2 Delivery O2 Flow Rate FiO2


 


9/1/20 09:00      Room Air  


 


9/1/20 08:00 98.8 73 18 133/77 (95) 93   


 


9/1/20 04:00 96.8 76 18 135/73 (93) 93   


 


9/1/20 00:00 97.3 79 18 143/82 (102) 96   


 


8/31/20 21:00      Room Air  


 


8/31/20 19:02 98.2 75 18 110/56 (74) 95   


 


8/31/20 15:02 97.2 81 18 134/81 (98) 97   

















Intake and Output  


 


 8/31/20 9/1/20





 19:00 07:00


 


Intake Total 1701 ml 1560 ml


 


Balance 1701 ml 1560 ml


 


  


 


IV Total 701 ml 1200 ml


 


Other 1000 ml 360 ml


 


# Voids  3


 


# Bowel Movements 1 








General Appearance:  no acute distress


HEENT:  normocephalic


Respiratory:  chest wall non-tender, lungs clear


Cardiovascular:  normal peripheral pulses


Abdomen:  normal bowel sounds





Current Medications








 Medications


  (Trade)  Dose


 Ordered  Sig/Leslie


 Route


 PRN Reason  Start Time


 Stop Time Status Last Admin


Dose Admin


 


 Acetaminophen


  (Tylenol)  650 mg  Q4H  PRN


 ORAL


 Mild Pain (Pain Scale 1-3)  8/28/20 13:15


 9/27/20 13:14   


 


 


 Dextrose


  (Dextrose 50%)  25 ml  Q30M  PRN


 IV


 Hypoglycemia  8/28/20 13:15


 11/26/20 13:14   


 


 


 Dextrose


  (Dextrose 50%)  50 ml  Q30M  PRN


 IV


 Hypoglycemia  8/28/20 13:15


 11/26/20 13:14   


 


 


 Dextrose/


 Electrolytes  1,000 ml @ 


 100 mls/hr  Q10H


 IV


   8/28/20 14:00


 9/27/20 13:59  9/1/20 10:05


 


 


 Enoxaparin Sodium


  (Lovenox)  40 mg  DAILY


 SUBQ


   8/28/20 14:00


 11/26/20 13:59  9/1/20 08:39


 


 


 Famotidine


  (Pepcid)  20 mg  BID


 ORAL


   8/28/20 14:00


 11/26/20 13:59  9/1/20 08:39


 


 


 Guaifenesin/


 Codeine Phosphate


  (Robitussin with


 codeine)  5 ml  Q6H  PRN


 ORAL


 For Cough  8/30/20 11:44


 9/29/20 11:43   


 


 


 Magnesium


 Hydroxide


  (Mom)  30 ml  HSPRN  PRN


 ORAL


 Constipation  8/28/20 13:15


 9/27/20 13:14  8/29/20 21:02


 


 


 Morphine Sulfate


  (Morphine


 Sulfate)  2 mg  Q3H  PRN


 IVP


 Moderate Pain (Pain Scale 4-6)  8/28/20 13:15


 9/4/20 13:14  8/30/20 03:08


 


 


 Morphine Sulfate


  (Morphine


 Sulfate)  4 mg  Q3H  PRN


 IVP


 Severe Pain (Pain Scale 7-10)  8/28/20 13:15


 9/4/20 13:14  8/29/20 20:56


 


 


 Ondansetron HCl


  (Zofran)  4 mg  Q6H  PRN


 IVP


 Nausea & Vomiting  8/28/20 13:15


 9/27/20 13:14   


 


 


 Zolpidem Tartrate


  (Ambien)  5 mg  HSPRN  PRN


 ORAL


 Insomnia  8/28/20 13:15


 9/4/20 13:14   


 











Assessment/Plan


Assessment/Plan


IMPRESSION:


1. COVID-19 pneumonia.


2. Normoxemia.





DISCUSSION:  Continue medications.  DC home














  ______________________________________________


  ALEX Kearns Omar Syed MD Sep 1, 2020 11:22

## 2020-09-01 NOTE — INFECTIOUS DISEASES PROG NOTE
Assessment/Plan


Assessment/Plan


IMPRESSION:  


COVID-19 disease.  Seems patient had no hypoxemia. 


History of colon cancer.  


Has lymphopenia.


Diarrhea resolved





RECOMMENDATION:  


Observe off antibiotic





Subjective


ROS Limited/Unobtainable:  Yes


Constitutional:  Reports: no symptoms


Respiratory:  Reports: no symptoms


Cardiovascular:  Reports: no symptoms


Gastrointestinal/Abdominal:  Reports: no symptoms


Allergies:  


Coded Allergies:  


     No Known Allergies (Unverified , 4/5/13)





Objective





Last 24 Hour Vital Signs








  Date Time  Temp Pulse Resp B/P (MAP) Pulse Ox O2 Delivery O2 Flow Rate FiO2


 


9/1/20 09:00      Room Air  


 


9/1/20 08:00 98.8 73 18 133/77 (95) 93   


 


9/1/20 04:00 96.8 76 18 135/73 (93) 93   


 


9/1/20 00:00 97.3 79 18 143/82 (102) 96   


 


8/31/20 21:00      Room Air  


 


8/31/20 19:02 98.2 75 18 110/56 (74) 95   


 


8/31/20 15:02 97.2 81 18 134/81 (98) 97   








Height (Feet):  5


Height (Inches):  4.00


Weight (Pounds):  179


General Appearance:  no acute distress


HEENT:  mucous membranes moist


Respiratory/Chest:  lungs clear


Cardiovascular:  normal rate


Abdomen:  soft, non tender


Extremities:  no edema


Neurologic/Psychiatric:  alert, oriented x 3, responsive





Current Medications








 Medications


  (Trade)  Dose


 Ordered  Sig/Leslie


 Route


 PRN Reason  Start Time


 Stop Time Status Last Admin


Dose Admin


 


 Acetaminophen


  (Tylenol)  650 mg  Q4H  PRN


 ORAL


 Mild Pain (Pain Scale 1-3)  8/28/20 13:15


 9/27/20 13:14  9/1/20 11:45


 


 


 Dextrose


  (Dextrose 50%)  25 ml  Q30M  PRN


 IV


 Hypoglycemia  8/28/20 13:15


 11/26/20 13:14   


 


 


 Dextrose


  (Dextrose 50%)  50 ml  Q30M  PRN


 IV


 Hypoglycemia  8/28/20 13:15


 11/26/20 13:14   


 


 


 Dextrose/


 Electrolytes  1,000 ml @ 


 100 mls/hr  Q10H


 IV


   8/28/20 14:00


 9/27/20 13:59  9/1/20 10:05


 


 


 Enoxaparin Sodium


  (Lovenox)  40 mg  DAILY


 SUBQ


   8/28/20 14:00


 11/26/20 13:59  9/1/20 08:39


 


 


 Famotidine


  (Pepcid)  20 mg  BID


 ORAL


   8/28/20 14:00


 11/26/20 13:59  9/1/20 08:39


 


 


 Guaifenesin/


 Codeine Phosphate


  (Robitussin with


 codeine)  5 ml  Q6H  PRN


 ORAL


 For Cough  8/30/20 11:44


 9/29/20 11:43   


 


 


 Magnesium


 Hydroxide


  (Mom)  30 ml  HSPRN  PRN


 ORAL


 Constipation  8/28/20 13:15


 9/27/20 13:14  8/29/20 21:02


 


 


 Morphine Sulfate


  (Morphine


 Sulfate)  2 mg  Q3H  PRN


 IVP


 Moderate Pain (Pain Scale 4-6)  8/28/20 13:15


 9/4/20 13:14  8/30/20 03:08


 


 


 Morphine Sulfate


  (Morphine


 Sulfate)  4 mg  Q3H  PRN


 IVP


 Severe Pain (Pain Scale 7-10)  8/28/20 13:15


 9/4/20 13:14  8/29/20 20:56


 


 


 Ondansetron HCl


  (Zofran)  4 mg  Q6H  PRN


 IVP


 Nausea & Vomiting  8/28/20 13:15


 9/27/20 13:14   


 


 


 Zolpidem Tartrate


  (Ambien)  5 mg  HSPRN  PRN


 ORAL


 Insomnia  8/28/20 13:15


 9/4/20 13:14   


 

















Anatoly Burch MD Sep 1, 2020 12:56

## 2020-09-04 NOTE — DISCHARGE SUMMARY
Discharge Summary


Discharge Summary


_


DATE OF ADMISSION: 8/28/2020





DATE OF DISCHARGE: 9/1/2020








DISCHARGED BY: Andrew Burch





REASON FOR ADMISSION: 


70 years old male with past medical history of colon cancer, reported 4 days of 

generalized weakness and dizziness.  


Patient reported non bloody diarrhea.  


Patient was seen at [facility] and tested positive for COVID-19.  


Diarrhea continued , and he eventually came to emergency room for further 

evaluation and management.  


Patient  denied  shortness of breath.  


Upon evaluation patient  was afebrile,  pulse oximetry was stable on room air.  


Laboratory work-up revealed mild leukopenia,  stable hemoglobin and  

hematocrit.  


ESR 24.  D-dimer 0.26.  Ferritin 135 . CRP less than 0.4.  


Urinalysis revealed no evidence of  UTI.


Stable electrolytes  and renal parameters.  


Glucose 102.  


Stable LFT and lipase.  


Chest x-ray revealed no acute pathology.  


Repeated COVID-19 in the emergency room was positive.  


Patient subsequently admitted for further management.





CONSULTANTS:


pulmonary Dr. Mills


ID specialist Dr. Anatoly Burch


 


 


HOSPITAL COURSE: 


Patient admitted to medical surgical floor to isolation room.  


Patient initially started on empiric antibiotic.  


Patient started on IV fluids and full liquid diet. 


DVT prophylaxis provided.  


Supplemental oxygen and pulmonary toilet were on board as needed.


Pulse ox symmetry remained stable on room air.


Antitussive  provided as needed


No pulmonary symptoms.


ID specialist recommended to keep patient off antibiotic and observe closely.  


Patient remained afebrile, no leukocytosis.


Lymphopenia noted





Supportive care provided. 


Antiemetic was on board as needed.  


GI prophylaxis provided.  


Diet was advanced as tolerated.  


Diarrhea resolved.  


Patient was able to tolerate diet.  


Patient clinically stabilized and was ready for discharge home





FINAL DIAGNOSES: 


COVID-19 pneumonia


History of colon cancer


Diarrhea resolved


Lymphopenia





DISCHARGE MEDICATIONS:


See Medication Reconciliation list.





DISCHARGE INSTRUCTIONS:


Patient was discharged home.  


Follow-up with her primary care provider.





I have been assigned to dictate discharge summary for this account. 


I was not involved in the patient's management.











Ambika Yuan NP Sep 4, 2020 08:56